# Patient Record
Sex: MALE | Race: WHITE | URBAN - NONMETROPOLITAN AREA
[De-identification: names, ages, dates, MRNs, and addresses within clinical notes are randomized per-mention and may not be internally consistent; named-entity substitution may affect disease eponyms.]

---

## 2019-05-10 ENCOUNTER — TELEPHONE (OUTPATIENT)
Dept: FAMILY MEDICINE CLINIC | Age: 24
End: 2019-05-10

## 2019-05-10 DIAGNOSIS — L03.90 CELLULITIS, UNSPECIFIED CELLULITIS SITE: Primary | ICD-10-CM

## 2019-05-10 RX ORDER — AMOXICILLIN AND CLAVULANATE POTASSIUM 875; 125 MG/1; MG/1
1 TABLET, FILM COATED ORAL 2 TIMES DAILY
Qty: 20 TABLET | Refills: 0 | Status: SHIPPED | OUTPATIENT
Start: 2019-05-10 | End: 2019-05-13

## 2019-05-10 NOTE — TELEPHONE ENCOUNTER
Mother showed you a picture of his rash. Seeing you on Monday 5/13. Can you call in a Rx to [facility]?       Prescription sent to pharmacy

## 2019-05-13 ENCOUNTER — OFFICE VISIT (OUTPATIENT)
Dept: FAMILY MEDICINE CLINIC | Age: 24
End: 2019-05-13
Payer: COMMERCIAL

## 2019-05-13 VITALS
BODY MASS INDEX: 28 KG/M2 | HEART RATE: 77 BPM | WEIGHT: 164 LBS | HEIGHT: 64 IN | SYSTOLIC BLOOD PRESSURE: 110 MMHG | OXYGEN SATURATION: 99 % | DIASTOLIC BLOOD PRESSURE: 76 MMHG

## 2019-05-13 DIAGNOSIS — Z72.0 CHEWS TOBACCO REGULARLY: ICD-10-CM

## 2019-05-13 DIAGNOSIS — L25.9 CONTACT DERMATITIS, UNSPECIFIED CONTACT DERMATITIS TYPE, UNSPECIFIED TRIGGER: Primary | ICD-10-CM

## 2019-05-13 PROCEDURE — 99202 OFFICE O/P NEW SF 15 MIN: CPT | Performed by: FAMILY MEDICINE

## 2019-05-13 PROCEDURE — 4004F PT TOBACCO SCREEN RCVD TLK: CPT | Performed by: FAMILY MEDICINE

## 2019-05-13 PROCEDURE — G8427 DOCREV CUR MEDS BY ELIG CLIN: HCPCS | Performed by: FAMILY MEDICINE

## 2019-05-13 PROCEDURE — G8419 CALC BMI OUT NRM PARAM NOF/U: HCPCS | Performed by: FAMILY MEDICINE

## 2019-05-13 RX ORDER — FLUTICASONE PROPIONATE 0.05 %
CREAM (GRAM) TOPICAL
Qty: 30 G | Refills: 1 | Status: SHIPPED | OUTPATIENT
Start: 2019-05-13 | End: 2019-06-12

## 2019-05-13 ASSESSMENT — ENCOUNTER SYMPTOMS
WHEEZING: 0
COUGH: 0
ABDOMINAL DISTENTION: 0
SHORTNESS OF BREATH: 0
ABDOMINAL PAIN: 0

## 2019-05-13 NOTE — PROGRESS NOTES
[address] [address] [address]  Dept: [phone]  DeptFax: [phone]    Za Walker is a21 y.o. male who presents today for his medical conditions/complaints as noted below. Za Walker is c/o of New Patient (pt here to get re-est and thinks he has a reoccurence of MRSA, has a rash on left side of torso, developed about two weeks ago, itches)      HPI:     HPI     Patient is a 28-year-old male presents to the office with a rash. Actually his mother showed me a picture of his rash. At her office visit. She was afraid that the patient had MRSA. The patient himself is unfamiliar with MRSA is. We sent and antibiotics, cephalexin. And asked him return the office today. The rash is pruritic. Patient denies any past medical or past surgical history    He takes no medications including over-the-counter medications or supplements    Allergies include sulfa drugs which cause swelling and rash    Family history was noncontributory    Social history is significant for smokeless tobacco, chewing tobacco.  Last 7 to 8 years. Denies significant alcohol maybe 1 or 2 drinks on the weekend. Denies other drugs. He is single. Lives at home. Has no children. 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 No past surgical history on file. No family history on file. Social History     Tobacco Use    Smoking status: Never Smoker    Smokeless tobacco: Current User     Types: Chew   Substance Use Topics    Alcohol use: Yes        Current Outpatient Medications   Medication Sig Dispense Refill    fluticasone (CUTIVATE) 0.05 % cream Apply topically twice daily 30 g 1     No current facility-administered medications for this visit.       Allergies   Allergen Reactions    Sulfa Antibiotics Rash       Health Maintenance   Topic Date Due    Varicella Vaccine (1 of 2 - 13+ 2-dose series) 02/07/2009    HIV screen  02/07/2011    DTaP/Tdap/Td vaccine (1 - Tdap) 02/07/2015    Flu vaccine (Season Ended) 09/01/2019    HPV vaccine  Aged Out    Pneumococcal 0-64 years Vaccine  Aged Out       No results found for: K, CREATININE, AST, ALT, TSH, HCT, LABA1C, MICROALBUR, PSA, GLUCOSE, MG, CALCIUM, LAGHEJIZ92, VITD25, FERRITIN, TIBC, IRON No results found for: CHOL, TRIG, HDL, LDLCHOLESTEROL    Subjective:      Review of Systems   Constitutional: Negative for chills, diaphoresis and fever. HENT: Negative for mouth sores. Respiratory: Negative for cough, shortness of breath and wheezing. Cardiovascular: Negative for chest pain, palpitations and leg swelling. Gastrointestinal: Negative for abdominal distention and abdominal pain. Genitourinary: Negative for difficulty urinating, dysuria and hematuria. Musculoskeletal: Negative. Skin: Positive for rash. Negative for wound. Hematological: Negative for adenopathy. Objective:     /76   Pulse 77   Ht 5' 4.11\" (1.628 m)   Wt 164 lb (74.4 kg)   SpO2 99%   BMI 28.05 kg/m²     Physical Exam   Constitutional: No distress. HENT:   Right Ear: A foreign body (cerumen impaction) is present. Left Ear: A foreign body (cerumen impaction) is present. Mouth/Throat: Oropharynx is clear and moist and mucous membranes are normal. No oral lesions. Normal dentition. No leukoplakia seen   Cardiovascular: Normal rate, regular rhythm and normal heart sounds. Pulmonary/Chest: Effort normal and breath sounds normal.   Skin:            Assessment:      Diagnosis Orders   1. Contact dermatitis, unspecified contact dermatitis type, unspecified trigger  fluticasone (CUTIVATE) 0.05 % cream   2. Chews tobacco regularly              POC Testing Results (If Applicable):  No results found for this visit on 05/13/19.     Plan:     Marian Levin to be a contact dermatitis. Perhaps there was some cellulitis from the scratching. He'll finish his antibiotics. He will use topical fluticasone. He is encouraged to quit chewing. He is encouraged to follow up with dentistry a regular basis for surveillance. He may recheck with me as scheduled    Orders Given:  No orders of the defined types were placed in this encounter. Prescriptions:    Orders Placed This Encounter   Medications    fluticasone (CUTIVATE) 0.05 % cream     Sig: Apply topically twice daily     Dispense:  30 g     Refill:  1        Return if symptoms worsen or fail to improve. Electronically signed by Clyde Wheeler MD on5/13/2019. **This report has been created using voice recognition software. It may contain minor errors which are inherent in voice recognition technology. **

## 2020-03-05 ENCOUNTER — OFFICE VISIT (OUTPATIENT)
Dept: FAMILY MEDICINE CLINIC | Age: 25
End: 2020-03-05
Payer: COMMERCIAL

## 2020-03-05 VITALS
BODY MASS INDEX: 30.45 KG/M2 | OXYGEN SATURATION: 98 % | DIASTOLIC BLOOD PRESSURE: 80 MMHG | SYSTOLIC BLOOD PRESSURE: 138 MMHG | WEIGHT: 178 LBS | HEART RATE: 85 BPM

## 2020-03-05 PROCEDURE — G8484 FLU IMMUNIZE NO ADMIN: HCPCS | Performed by: FAMILY MEDICINE

## 2020-03-05 PROCEDURE — G8417 CALC BMI ABV UP PARAM F/U: HCPCS | Performed by: FAMILY MEDICINE

## 2020-03-05 PROCEDURE — G8427 DOCREV CUR MEDS BY ELIG CLIN: HCPCS | Performed by: FAMILY MEDICINE

## 2020-03-05 PROCEDURE — 4004F PT TOBACCO SCREEN RCVD TLK: CPT | Performed by: FAMILY MEDICINE

## 2020-03-05 PROCEDURE — 99213 OFFICE O/P EST LOW 20 MIN: CPT | Performed by: FAMILY MEDICINE

## 2020-03-05 RX ORDER — HYDROCODONE BITARTRATE AND ACETAMINOPHEN 5; 325 MG/1; MG/1
TABLET ORAL
COMMUNITY
Start: 2020-02-25

## 2020-03-05 RX ORDER — DOXYCYCLINE HYCLATE 100 MG
TABLET ORAL
COMMUNITY
Start: 2020-02-25 | End: 2020-03-05 | Stop reason: ALTCHOICE

## 2020-03-05 ASSESSMENT — PATIENT HEALTH QUESTIONNAIRE - PHQ9
SUM OF ALL RESPONSES TO PHQ QUESTIONS 1-9: 0
SUM OF ALL RESPONSES TO PHQ9 QUESTIONS 1 & 2: 0
1. LITTLE INTEREST OR PLEASURE IN DOING THINGS: 0
SUM OF ALL RESPONSES TO PHQ QUESTIONS 1-9: 0
2. FEELING DOWN, DEPRESSED OR HOPELESS: 0

## 2020-03-05 ASSESSMENT — ENCOUNTER SYMPTOMS: RESPIRATORY NEGATIVE: 1
